# Patient Record
Sex: MALE
[De-identification: names, ages, dates, MRNs, and addresses within clinical notes are randomized per-mention and may not be internally consistent; named-entity substitution may affect disease eponyms.]

---

## 2021-04-25 PROBLEM — Z00.00 ENCOUNTER FOR PREVENTIVE HEALTH EXAMINATION: Status: ACTIVE | Noted: 2021-04-25

## 2021-04-26 ENCOUNTER — APPOINTMENT (OUTPATIENT)
Dept: DERMATOLOGY | Facility: CLINIC | Age: 26
End: 2021-04-26
Payer: COMMERCIAL

## 2021-04-26 DIAGNOSIS — Z84.0 FAMILY HISTORY OF DISEASES OF THE SKIN AND SUBCUTANEOUS TISSUE: ICD-10-CM

## 2021-04-26 PROCEDURE — 99202 OFFICE O/P NEW SF 15 MIN: CPT

## 2021-04-26 PROCEDURE — 99072 ADDL SUPL MATRL&STAF TM PHE: CPT

## 2021-04-26 NOTE — PHYSICAL EXAM
[Alert] : alert [Oriented x 3] : ~L oriented x 3 [Well Nourished] : well nourished [FreeTextEntry3] : Upper intergluteal cleft: Large vertical depression noted

## 2021-04-26 NOTE — HISTORY OF PRESENT ILLNESS
[FreeTextEntry1] : Hole on lower back [de-identified] : First visit for 25-year-old white male recently noticed a "hole" below the tailbone.\par Area is asymptomatic.  Apparently not oozing any debris

## 2021-04-29 ENCOUNTER — APPOINTMENT (OUTPATIENT)
Dept: SURGERY | Facility: CLINIC | Age: 26
End: 2021-04-29
Payer: COMMERCIAL

## 2021-04-29 VITALS
RESPIRATION RATE: 15 BRPM | BODY MASS INDEX: 24.44 KG/M2 | HEIGHT: 69 IN | SYSTOLIC BLOOD PRESSURE: 120 MMHG | HEART RATE: 87 BPM | DIASTOLIC BLOOD PRESSURE: 80 MMHG | OXYGEN SATURATION: 98 % | TEMPERATURE: 98.1 F | WEIGHT: 165 LBS

## 2021-04-29 DIAGNOSIS — L05.91 PILONIDAL CYST W/OUT ABSCESS: ICD-10-CM

## 2021-04-29 PROCEDURE — 99244 OFF/OP CNSLTJ NEW/EST MOD 40: CPT

## 2021-04-29 PROCEDURE — 99072 ADDL SUPL MATRL&STAF TM PHE: CPT

## 2021-04-29 NOTE — REVIEW OF SYSTEMS
[Fever] : no fever [Chills] : no chills [Eye Pain] : no eye pain [Earache] : no earache [Chest Pain] : no chest pain [Palpitations] : no palpitations [Abdominal Pain] : no abdominal pain [Dysuria] : no dysuria [Joint Pain] : no joint pain [Easy Bleeding] : no tendency for easy bleeding [Easy Bruising] : no tendency for easy bruising

## 2021-04-29 NOTE — PHYSICAL EXAM
[Normal Thyroid] : the thyroid was normal [Normal Breath Sounds] : Normal breath sounds [Normal Heart Sounds] : normal heart sounds [Abdominal Masses] : No abdominal masses [Tender] : was nontender [Alert] : alert [Oriented to Person] : oriented to person [Oriented to Place] : oriented to place [Oriented to Time] : oriented to time [Calm] : calm [de-identified] : NAD [de-identified] : NC/AT PER [de-identified] : pilonidal dimple - hair removed [de-identified] : moves all 4 extr